# Patient Record
Sex: FEMALE | URBAN - NONMETROPOLITAN AREA
[De-identification: names, ages, dates, MRNs, and addresses within clinical notes are randomized per-mention and may not be internally consistent; named-entity substitution may affect disease eponyms.]

---

## 2023-08-15 ENCOUNTER — OFFICE VISIT (OUTPATIENT)
Dept: FAMILY MEDICINE CLINIC | Age: 73
End: 2023-08-15
Payer: MEDICARE

## 2023-08-15 VITALS
SYSTOLIC BLOOD PRESSURE: 138 MMHG | HEART RATE: 67 BPM | OXYGEN SATURATION: 98 % | BODY MASS INDEX: 20.32 KG/M2 | DIASTOLIC BLOOD PRESSURE: 82 MMHG | HEIGHT: 64 IN | WEIGHT: 119 LBS

## 2023-08-15 DIAGNOSIS — F33.41 RECURRENT MAJOR DEPRESSIVE DISORDER, IN PARTIAL REMISSION (HCC): ICD-10-CM

## 2023-08-15 DIAGNOSIS — Z98.42 HX OF BILATERAL CATARACT EXTRACTION: ICD-10-CM

## 2023-08-15 DIAGNOSIS — Z95.1 STATUS POST CORONARY ARTERY BYPASS GRAFT: ICD-10-CM

## 2023-08-15 DIAGNOSIS — I20.8 STABLE ANGINA (HCC): ICD-10-CM

## 2023-08-15 DIAGNOSIS — G20 PARKINSON'S DISEASE (HCC): Primary | ICD-10-CM

## 2023-08-15 DIAGNOSIS — R30.0 BURNING WITH URINATION: ICD-10-CM

## 2023-08-15 DIAGNOSIS — R73.03 PREDIABETES: ICD-10-CM

## 2023-08-15 DIAGNOSIS — Z98.41 HX OF BILATERAL CATARACT EXTRACTION: ICD-10-CM

## 2023-08-15 DIAGNOSIS — Z53.20 SCREENING FOR MALIGNANT NEOPLASM OF COLON DECLINED: ICD-10-CM

## 2023-08-15 DIAGNOSIS — I10 PRIMARY HYPERTENSION: ICD-10-CM

## 2023-08-15 DIAGNOSIS — I25.118 CORONARY ARTERY DISEASE OF NATIVE ARTERY OF NATIVE HEART WITH STABLE ANGINA PECTORIS (HCC): ICD-10-CM

## 2023-08-15 DIAGNOSIS — G24.3 TORTICOLLIS, SPASMODIC: ICD-10-CM

## 2023-08-15 DIAGNOSIS — F41.1 GAD (GENERALIZED ANXIETY DISORDER): ICD-10-CM

## 2023-08-15 PROBLEM — G20.A1 PARKINSON'S DISEASE: Status: ACTIVE | Noted: 2023-08-15

## 2023-08-15 LAB
BILIRUBIN, POC: NORMAL
BLOOD URINE, POC: NORMAL
CLARITY, POC: NORMAL
COLOR, POC: NORMAL
GLUCOSE URINE, POC: NORMAL
KETONES, POC: NORMAL
LEUKOCYTE EST, POC: NORMAL
NITRITE, POC: NORMAL
PH, POC: 7
PROTEIN, POC: NORMAL
SPECIFIC GRAVITY, POC: 1.01
UROBILINOGEN, POC: 0.2

## 2023-08-15 PROCEDURE — 99205 OFFICE O/P NEW HI 60 MIN: CPT

## 2023-08-15 PROCEDURE — 36415 COLL VENOUS BLD VENIPUNCTURE: CPT

## 2023-08-15 PROCEDURE — 3079F DIAST BP 80-89 MM HG: CPT

## 2023-08-15 PROCEDURE — 81002 URINALYSIS NONAUTO W/O SCOPE: CPT

## 2023-08-15 PROCEDURE — 1123F ACP DISCUSS/DSCN MKR DOCD: CPT

## 2023-08-15 PROCEDURE — 3075F SYST BP GE 130 - 139MM HG: CPT

## 2023-08-15 RX ORDER — CYANOCOBALAMIN (VITAMIN B-12) 1000 MCG
TABLET ORAL
COMMUNITY

## 2023-08-15 RX ORDER — CLONAZEPAM 0.5 MG/1
0.5 TABLET ORAL NIGHTLY
COMMUNITY

## 2023-08-15 RX ORDER — MIDODRINE HYDROCHLORIDE 2.5 MG/1
TABLET ORAL
COMMUNITY
Start: 2023-06-18

## 2023-08-15 RX ORDER — CLONAZEPAM 0.5 MG/1
TABLET ORAL
COMMUNITY
Start: 2023-06-17

## 2023-08-15 RX ORDER — CITALOPRAM 20 MG/1
20 TABLET ORAL DAILY
COMMUNITY
Start: 2023-07-01

## 2023-08-15 RX ORDER — LEVODOPA AND CARBIDOPA 195; 48.75 MG/1; MG/1
CAPSULE, EXTENDED RELEASE ORAL
COMMUNITY
Start: 2023-08-04

## 2023-08-15 ASSESSMENT — ENCOUNTER SYMPTOMS
ABDOMINAL DISTENTION: 0
DIARRHEA: 0
COUGH: 0
EYE PAIN: 0
COLOR CHANGE: 0
SHORTNESS OF BREATH: 0
SORE THROAT: 0
CHEST TIGHTNESS: 0
EYE DISCHARGE: 0
CONSTIPATION: 0
ABDOMINAL PAIN: 0

## 2023-08-15 NOTE — PROGRESS NOTES
[facility]  Establish care visit   8/15/2023    Isabel Ontiveros (:  1950) is a 67 y.o. female, here to establish care. Chief Complaint   Patient presents with    Establish Care     Former pcp was Dr Giacomo Schilder Medications     For anxiety        ASSESSMENT/ PLAN  1. Parkinson's disease ([address])  -Failed Sinemet  -Currently being managed by neurology at [facility], Dr. Zaina Herzog  -On Rytary    2. JACKI (generalized anxiety disorder)  -Clonazepam and Celexa  -Clonazepam being prescribed by neurologist    3. Torticollis, spasmodic  -Receiving Botox injections every 3 months    4. Recurrent major depressive disorder, in partial remission ([address])  -Referral to psychology  -Celexa  - External Referral to Psychiatry    5. Status post coronary artery bypass graft  -Status post CABG x3 in   -Not currently on statin or beta-blocker at patient preference  -Neurologist also prefers to keep blood pressure secondary to syncopal episodes  - CBC with Auto Differential  - Comprehensive Metabolic Panel  - Lipid, Fasting    6. Stable angina (HCC)  -Apparently was about to be on Ranexa in the past but has not been for unclear reason    7. Primary hypertension  -Stable today off medication  - CBC with Auto Differential  - Comprehensive Metabolic Panel  - Lipid, Fasting    8. Coronary artery disease of native artery of native heart with stable angina pectoris ([address])  -See #5  -Required recently after CABG in 2018  - CBC with Auto Differential  - Comprehensive Metabolic Panel  - Lipid, Fasting    9. Prediabetes  -Recheck A1c today, last A1c was 5.8  - Hemoglobin A1C    10. Hx of bilateral cataract extraction  -Referral to GI  Abdiel Swartz MD, Ophthalmology, [facility]    11. Burning with urination  -Assess urinalysis  -Possibly prescribe Macrobid if urine triggers UTI  -I think this is secondary to medication  - POCT Urinalysis no Micro    12.  Screening for malignant neoplasm of colon

## 2023-08-15 NOTE — RESULT ENCOUNTER NOTE
UA negative [Well Groomed] : well groomed [General Appearance - In No Acute Distress] : no acute distress [Normal Conjunctiva] : the conjunctiva exhibited no abnormalities [Eyelids - No Xanthelasma] : the eyelids demonstrated no xanthelasmas [Normal Oral Mucosa] : normal oral mucosa [No Oral Pallor] : no oral pallor [No Oral Cyanosis] : no oral cyanosis [Normal Jugular Venous A Waves Present] : normal jugular venous A waves present [Normal Jugular Venous V Waves Present] : normal jugular venous V waves present [No Jugular Venous Aparicio A Waves] : no jugular venous aparicio A waves [Respiration, Rhythm And Depth] : normal respiratory rhythm and effort [Exaggerated Use Of Accessory Muscles For Inspiration] : no accessory muscle use [Auscultation Breath Sounds / Voice Sounds] : lungs were clear to auscultation bilaterally [Abdomen Soft] : soft [Abdomen Tenderness] : non-tender [Abdomen Mass (___ Cm)] : no abdominal mass palpated [Abnormal Walk] : normal gait [Gait - Sufficient For Exercise Testing] : the gait was sufficient for exercise testing [Nail Clubbing] : no clubbing of the fingernails [Cyanosis, Localized] : no localized cyanosis [Petechial Hemorrhages (___cm)] : no petechial hemorrhages [Skin Color & Pigmentation] : normal skin color and pigmentation [] : no rash [No Venous Stasis] : no venous stasis [Skin Lesions] : no skin lesions [No Skin Ulcers] : no skin ulcer [No Xanthoma] : no  xanthoma was observed [Oriented To Time, Place, And Person] : oriented to person, place, and time [Affect] : the affect was normal [Mood] : the mood was normal [No Anxiety] : not feeling anxious [Normal Rate] : normal [Rhythm Regular] : regular [Normal S1] : normal S1 [Normal S2] : normal S2 [No Gallop] : no gallop heard [No Murmur] : no murmurs heard [2+] : left 2+ [Right Carotid Bruit] : no bruit heard over the right carotid [Left Carotid Bruit] : no bruit heard over the left carotid [___+] : [unfilled]U+ pretibial pitting edema on the left

## 2023-08-16 DIAGNOSIS — E78.2 MIXED HYPERLIPIDEMIA: Primary | ICD-10-CM

## 2023-08-16 LAB
ALBUMIN SERPL-MCNC: 4.5 G/DL (ref 3.4–5)
ALBUMIN/GLOB SERPL: 1.9 {RATIO} (ref 1.1–2.2)
ALP SERPL-CCNC: 109 U/L (ref 40–129)
ALT SERPL-CCNC: 6 U/L (ref 10–40)
ANION GAP SERPL CALCULATED.3IONS-SCNC: 11 MMOL/L (ref 3–16)
AST SERPL-CCNC: 29 U/L (ref 15–37)
BASOPHILS # BLD: 0 K/UL (ref 0–0.2)
BASOPHILS NFR BLD: 0.3 %
BILIRUB SERPL-MCNC: <0.2 MG/DL (ref 0–1)
BUN SERPL-MCNC: 15 MG/DL (ref 7–20)
CALCIUM SERPL-MCNC: 9.6 MG/DL (ref 8.3–10.6)
CHLORIDE SERPL-SCNC: 99 MMOL/L (ref 99–110)
CHOLEST SERPL-MCNC: 261 MG/DL (ref 0–199)
CO2 SERPL-SCNC: 30 MMOL/L (ref 21–32)
CREAT SERPL-MCNC: <0.5 MG/DL (ref 0.6–1.2)
DEPRECATED RDW RBC AUTO: 13.5 % (ref 12.4–15.4)
EOSINOPHIL # BLD: 0 K/UL (ref 0–0.6)
EOSINOPHIL NFR BLD: 0.5 %
EST. AVERAGE GLUCOSE BLD GHB EST-MCNC: 114 MG/DL
GFR SERPLBLD CREATININE-BSD FMLA CKD-EPI: >60 ML/MIN/{1.73_M2}
GLUCOSE SERPL-MCNC: 97 MG/DL (ref 70–99)
HBA1C MFR BLD: 5.6 %
HCT VFR BLD AUTO: 41.6 % (ref 36–48)
HDLC SERPL-MCNC: 75 MG/DL (ref 40–60)
HGB BLD-MCNC: 13.8 G/DL (ref 12–16)
LDL CHOLESTEROL CALCULATED: 170 MG/DL
LYMPHOCYTES # BLD: 1.6 K/UL (ref 1–5.1)
LYMPHOCYTES NFR BLD: 27.4 %
MCH RBC QN AUTO: 32.6 PG (ref 26–34)
MCHC RBC AUTO-ENTMCNC: 33.1 G/DL (ref 31–36)
MCV RBC AUTO: 98.6 FL (ref 80–100)
MONOCYTES # BLD: 0.5 K/UL (ref 0–1.3)
MONOCYTES NFR BLD: 8.6 %
NEUTROPHILS # BLD: 3.8 K/UL (ref 1.7–7.7)
NEUTROPHILS NFR BLD: 63.2 %
PLATELET # BLD AUTO: 280 K/UL (ref 135–450)
PMV BLD AUTO: 9.1 FL (ref 5–10.5)
POTASSIUM SERPL-SCNC: 4.2 MMOL/L (ref 3.5–5.1)
PROT SERPL-MCNC: 6.9 G/DL (ref 6.4–8.2)
RBC # BLD AUTO: 4.22 M/UL (ref 4–5.2)
SODIUM SERPL-SCNC: 140 MMOL/L (ref 136–145)
TRIGL SERPL-MCNC: 79 MG/DL (ref 0–150)
VLDLC SERPL CALC-MCNC: 16 MG/DL
WBC # BLD AUTO: 6 K/UL (ref 4–11)

## 2023-08-16 RX ORDER — EZETIMIBE 10 MG/1
10 TABLET ORAL DAILY
Qty: 30 TABLET | Refills: 3 | Status: SHIPPED | OUTPATIENT
Start: 2023-08-16

## 2023-08-16 NOTE — RESULT ENCOUNTER NOTE
Kidney function stable    Cholesterol panel does show elevated total cholesterol as well as LDL.   Again I would advocate for cholesterol medicine to reduce risk of cardiovascular disease considering her history of coronary artery disease already    CBC stable

## 2023-10-02 ENCOUNTER — TELEPHONE (OUTPATIENT)
Dept: FAMILY MEDICINE CLINIC | Age: 73
End: 2023-10-02

## 2023-10-02 DIAGNOSIS — E78.2 MIXED HYPERLIPIDEMIA: ICD-10-CM

## 2023-10-02 NOTE — TELEPHONE ENCOUNTER
----- Message from Trish Sauer sent at 9/29/2023  3:17 PM EDT -----  Subject: Results Request    QUESTIONS  Results: Marie Dang; Ordered by: Julee Yeh   Date Performed: 2023-08-18  ---------------------------------------------------------------------------  --------------  [facility] INFO    [phone]; OK to leave message on voicemail  ---------------------------------------------------------------------------  --------------

## 2023-10-04 RX ORDER — EZETIMIBE 10 MG/1
10 TABLET ORAL DAILY
Qty: 30 TABLET | Refills: 3 | Status: SHIPPED | OUTPATIENT
Start: 2023-10-04

## 2023-10-04 NOTE — TELEPHONE ENCOUNTER
Patient code word is \"Cielo\" was informed that she will need to provide this word to us for future communications to make sure that we are speaking to her. If the word is given to her sister and she is not informed in the future it is at her own liability. Patient was informed of all results, is agreeable to starting Zetia (as she was never informed of need for this medication from sister) will need script resent to [facility]. [facility].

## 2023-10-04 NOTE — TELEPHONE ENCOUNTER
Discussed with Marco Antonio Morales (Reg) who received a call back - person on the phone identified herself as being the patient but stated that previous information regarding results and medication documented on 8/16/2023 9:09 am was not her, it was in fact her sister \"pretending to be her\" which she is well aware of and approves her doing so. Patient was advised to not do that in the future as we were unaware that she was never informed of that conversation. Marco Antonio Morales called pt back to inform her she must now provide a code word for future calls.

## 2023-10-04 NOTE — TELEPHONE ENCOUNTER
Pended Rx to be sent to [facility] per patient request - was previously sent to [facility]

## 2023-10-12 ENCOUNTER — COMMUNITY OUTREACH (OUTPATIENT)
Dept: FAMILY MEDICINE CLINIC | Age: 73
End: 2023-10-12

## 2023-10-18 ENCOUNTER — APPOINTMENT (OUTPATIENT)
Dept: CT IMAGING | Age: 73
End: 2023-10-18
Payer: MEDICARE

## 2023-10-18 ENCOUNTER — HOSPITAL ENCOUNTER (EMERGENCY)
Age: 73
Discharge: HOME | End: 2023-10-18
Payer: MEDICARE

## 2023-10-18 VITALS
OXYGEN SATURATION: 100 % | SYSTOLIC BLOOD PRESSURE: 146 MMHG | DIASTOLIC BLOOD PRESSURE: 82 MMHG | RESPIRATION RATE: 19 BRPM | HEART RATE: 76 BPM | TEMPERATURE: 98.1 F

## 2023-10-18 DIAGNOSIS — S09.90XA INJURY OF HEAD, INITIAL ENCOUNTER: Primary | ICD-10-CM

## 2023-10-18 DIAGNOSIS — R03.0 ELEVATED BLOOD PRESSURE READING: ICD-10-CM

## 2023-10-18 DIAGNOSIS — T07.XXXA MULTIPLE ABRASIONS: ICD-10-CM
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
## 2023-10-18 PROCEDURE — 99284 EMERGENCY DEPT VISIT MOD MDM: CPT

## 2023-10-18 PROCEDURE — 90715 TDAP VACCINE 7 YRS/> IM: CPT | Performed by: REGISTERED NURSE

## 2023-10-18 PROCEDURE — 6360000002 HC RX W HCPCS: Performed by: REGISTERED NURSE

## 2023-10-18 PROCEDURE — 70450 CT HEAD/BRAIN W/O DYE: CPT

## 2023-10-18 PROCEDURE — 72125 CT NECK SPINE W/O DYE: CPT

## 2023-10-18 PROCEDURE — 90471 IMMUNIZATION ADMIN: CPT | Performed by: REGISTERED NURSE

## 2023-10-18 PROCEDURE — 6370000000 HC RX 637 (ALT 250 FOR IP): Performed by: REGISTERED NURSE

## 2023-10-18 PROCEDURE — 70486 CT MAXILLOFACIAL W/O DYE: CPT

## 2023-10-18 RX ORDER — ACETAMINOPHEN 500 MG
1000 TABLET ORAL ONCE
Status: COMPLETED | OUTPATIENT
Start: 2023-10-18 | End: 2023-10-18

## 2023-10-18 RX ADMIN — ACETAMINOPHEN 1000 MG: 500 TABLET ORAL at 18:43

## 2023-10-18 RX ADMIN — TETANUS TOXOID, REDUCED DIPHTHERIA TOXOID AND ACELLULAR PERTUSSIS VACCINE, ADSORBED 0.5 ML: 5; 2.5; 8; 8; 2.5 SUSPENSION INTRAMUSCULAR at 18:50

## 2023-10-18 ASSESSMENT — PAIN - FUNCTIONAL ASSESSMENT
PAIN_FUNCTIONAL_ASSESSMENT: NONE - DENIES PAIN
PAIN_FUNCTIONAL_ASSESSMENT: 0-10

## 2023-10-18 ASSESSMENT — LIFESTYLE VARIABLES
HOW MANY STANDARD DRINKS CONTAINING ALCOHOL DO YOU HAVE ON A TYPICAL DAY: PATIENT DOES NOT DRINK
HOW OFTEN DO YOU HAVE A DRINK CONTAINING ALCOHOL: NEVER

## 2023-10-18 ASSESSMENT — PAIN SCALES - GENERAL: PAINLEVEL_OUTOF10: 7

## 2023-10-18 ASSESSMENT — PAIN DESCRIPTION - LOCATION: LOCATION: FACE

## 2023-10-18 NOTE — ED NOTES
Pt off floor at [address] [facility], [facility], [facility] [address], [address] [place] [address]  10/18/23 [id number]

## 2023-10-18 NOTE — ED NOTES
Report given to Billy, [facility], [facility] [address], DANNI  10/18/23 1921

## 2023-10-19 NOTE — DISCHARGE INSTRUCTIONS
You may alternate Tylenol and ibuprofen as needed for pain. Please follow-up with your primary care physician in the next 2 to 3 days for reevaluation. Monitor your wounds closely for signs of infection such as redness, swelling, increased drainage, fevers or increasing pain. You can apply antibacterial ointment to the abrasions over the next couple of days and then change to Aquaphor to help with healing.